# Patient Record
Sex: FEMALE | Race: WHITE
[De-identification: names, ages, dates, MRNs, and addresses within clinical notes are randomized per-mention and may not be internally consistent; named-entity substitution may affect disease eponyms.]

---

## 2017-04-11 ENCOUNTER — HOSPITAL ENCOUNTER (OUTPATIENT)
Dept: HOSPITAL 47 - RADUSWWP | Age: 22
Discharge: HOME | End: 2017-04-11
Payer: COMMERCIAL

## 2017-04-11 DIAGNOSIS — N91.2: ICD-10-CM

## 2017-04-11 DIAGNOSIS — R10.9: Primary | ICD-10-CM

## 2017-04-11 PROCEDURE — 76856 US EXAM PELVIC COMPLETE: CPT

## 2017-04-11 NOTE — US
EXAMINATION TYPE: US pelvic complete

 

DATE OF EXAM: 4/11/2017 2:20 PM

 

COMPARISON: NONE

 

CLINICAL HISTORY: 22-year-old female R10.9 Unspecified abdominal pain N91.2 Amenorrhea. On birth cont
rol for 7 years, never had a good outcome from birth control, always had problems, irregular cycles n
ow, patient states she had bladder issues also

 

Date of LMP:  irregular - lasted 1 day in March, very was heavy, 3 day light cycle in February

 

TECHNIQUE: Multiple transabdominal sonographic images of the pelvis are obtained.

 

FINDINGS:

 

Uterus:  Anteverted measuring 8.3 x 4.1 x 3.0 cm

 

Endometrial Stripe: 0.6 cm

 

Right Ovary:  3.1 x 1.9 x 1.5 cm

Left Ovary:  2.8 x 2.6 x 1.9 cm

 

There is follicular change on both sides.

 

No evident adnexal abnormality or cul-de-sac free fluid.

 

 

 

IMPRESSION: 

Endometrial stripe measuring 6 mm thick. Normal follicular change on both sides. No pelvic free fluid
.

## 2017-08-31 ENCOUNTER — HOSPITAL ENCOUNTER (EMERGENCY)
Dept: HOSPITAL 47 - EC | Age: 22
Discharge: HOME | End: 2017-08-31
Payer: COMMERCIAL

## 2017-08-31 VITALS — SYSTOLIC BLOOD PRESSURE: 111 MMHG | RESPIRATION RATE: 18 BRPM | HEART RATE: 62 BPM | DIASTOLIC BLOOD PRESSURE: 55 MMHG

## 2017-08-31 VITALS — TEMPERATURE: 98 F

## 2017-08-31 DIAGNOSIS — Z53.20: ICD-10-CM
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
## 2017-08-31 DIAGNOSIS — Z79.899: ICD-10-CM

## 2017-08-31 DIAGNOSIS — S06.0X0A: Primary | ICD-10-CM

## 2017-08-31 DIAGNOSIS — G40.909: ICD-10-CM

## 2017-08-31 PROCEDURE — 70450 CT HEAD/BRAIN W/O DYE: CPT

## 2017-08-31 PROCEDURE — 99284 EMERGENCY DEPT VISIT MOD MDM: CPT

## 2017-08-31 NOTE — ED
General Adult HPI





- General


Chief complaint: Head Injury


Stated complaint: Fall, head injury


Time Seen by Provider: 08/31/17 16:01


Source: patient, RN notes reviewed, old records reviewed


Mode of arrival: ambulatory


Limitations: no limitations





- History of Present Illness


Initial comments: 





This is a 22-year-old female here for evaluation of headache.  Patient head 

injury yesterday and has had headaches since.  Mild nausea no vomiting history 

of epilepsy but no longer on epileptic medication.  Patient's phone injury was 

a trip and fall rolling down a hill, did not lose consciousness





- Related Data


 Home Medications











 Medication  Instructions  Recorded  Confirmed


 


Cetirizine HCl [Zyrtec] 10 mg PO DAILY PRN 08/31/17 08/31/17


 


Multivitamins, Thera [Multivitamin 1 tab PO DAILY 08/31/17 08/31/17





(formulary)]   











 Allergies











Allergy/AdvReac Type Severity Reaction Status Date / Time


 


No Known Allergies Allergy   Verified 08/31/17 15:47














Review of Systems


ROS Statement: 


Those systems with pertinent positive or pertinent negative responses have been 

documented in the HPI.





ROS Other: All systems not noted in ROS Statement are negative.





Past Medical History


Past Medical History: Seizure Disorder


History of Any Multi-Drug Resistant Organisms: None Reported


Additional Past Surgical History / Comment(s): hemorrhoid


Past Psychological History: No Psychological Hx Reported


Smoking Status: Never smoker


Past Alcohol Use History: Occasional


Past Drug Use History: None Reported





General Exam


Limitations: no limitations


General appearance: alert, in no apparent distress


Head exam: Present: atraumatic, normocephalic, normal inspection


Eye exam: Present: normal appearance, PERRL, EOMI.  Absent: scleral icterus, 

conjunctival injection, periorbital swelling


ENT exam: Present: normal exam, mucous membranes moist


Neck exam: Present: normal inspection.  Absent: tenderness, meningismus, 

lymphadenopathy


Respiratory exam: Present: normal lung sounds bilaterally.  Absent: respiratory 

distress, wheezes, rales, rhonchi, stridor


Cardiovascular Exam: Present: regular rate, normal rhythm, normal heart sounds.

  Absent: systolic murmur, diastolic murmur, rubs, gallop, clicks


GI/Abdominal exam: Present: soft, normal bowel sounds.  Absent: distended, 

tenderness, guarding, rebound, rigid


Extremities exam: Present: normal inspection, full ROM, normal capillary 

refill.  Absent: tenderness, pedal edema, joint swelling, calf tenderness


Back exam: Present: normal inspection


Neurological exam: Present: alert, oriented X3, CN II-XII intact


Psychiatric exam: Present: normal affect, normal mood


Skin exam: Present: warm, dry, intact, normal color.  Absent: rash





Course


 Vital Signs











  08/31/17 08/31/17





  15:48 16:52


 


Temperature 98.5 F 98.8 F


 


Pulse Rate 77 67


 


Respiratory 16 16





Rate  


 


Blood Pressure 121/86 124/67


 


O2 Sat by Pulse 98 97





Oximetry  














- Reevaluation(s)


Reevaluation #1: 





08/31/17 17:17


Had headache is resolved





Medical Decision Making





- Medical Decision Making





22 female in the ER for evaluation of headache, closed head injury, CT negative

, headache is improved, patient woke and to follow-up with her neurologist as 

an outpatient basis, patient can be discharged home





- Radiology Data


Radiology results: report reviewed (CT brain is negative for acute disease), 

image reviewed





Disposition


Clinical Impression: 


 Closed head injury, Concussion without loss of consciousness





Disposition: HOME SELF-CARE


Condition: Good


Instructions:  Concussion (ED)


Referrals: 


Guadalupe Lyles MD [Primary Care Provider] - 1-2 days

## 2017-08-31 NOTE — CT
EXAMINATION TYPE: CT brain wo con

 

DATE OF EXAM: 8/31/2017

 

COMPARISON: 7/29/2013

 

HISTORY: Fall with head injury yesterday. Hx of epilepsy.

 

CT DLP: 1049.0 mGycm.  Automated Exposure Control for Dose Reduction was Utilized.

 

 

TECHNIQUE: CT scan of the head is performed without contrast.

 

FINDINGS: Ventricles and sulci appear normal. There is no mass effect nor midline shift. There is no 
sign of intracranial hemorrhage. The calvarium is intact.

 

 

IMPRESSION: Negative CT scan of the brain. No change..

## 2018-06-13 ENCOUNTER — HOSPITAL ENCOUNTER (EMERGENCY)
Dept: HOSPITAL 47 - EC | Age: 23
Discharge: HOME | End: 2018-06-13
Payer: COMMERCIAL

## 2018-06-13 VITALS
HEART RATE: 75 BPM | TEMPERATURE: 98 F | SYSTOLIC BLOOD PRESSURE: 116 MMHG | DIASTOLIC BLOOD PRESSURE: 63 MMHG | RESPIRATION RATE: 16 BRPM

## 2018-06-13 DIAGNOSIS — R11.2: ICD-10-CM

## 2018-06-13 DIAGNOSIS — R10.31: ICD-10-CM

## 2018-06-13 DIAGNOSIS — N83.202: Primary | ICD-10-CM

## 2018-06-13 LAB
ALBUMIN SERPL-MCNC: 4.4 G/DL (ref 3.5–5)
ALP SERPL-CCNC: 42 U/L (ref 38–126)
ALT SERPL-CCNC: 26 U/L (ref 9–52)
AMYLASE SERPL-CCNC: 90 U/L (ref 30–110)
ANION GAP SERPL CALC-SCNC: 11 MMOL/L
AST SERPL-CCNC: 20 U/L (ref 14–36)
BASOPHILS # BLD AUTO: 0.1 K/UL (ref 0–0.2)
BASOPHILS NFR BLD AUTO: 1 %
BUN SERPL-SCNC: 16 MG/DL (ref 7–17)
CALCIUM SPEC-MCNC: 9.2 MG/DL (ref 8.4–10.2)
CHLORIDE SERPL-SCNC: 103 MMOL/L (ref 98–107)
CO2 SERPL-SCNC: 25 MMOL/L (ref 22–30)
EOSINOPHIL # BLD AUTO: 0.3 K/UL (ref 0–0.7)
EOSINOPHIL NFR BLD AUTO: 2 %
ERYTHROCYTE [DISTWIDTH] IN BLOOD BY AUTOMATED COUNT: 4.51 M/UL (ref 3.8–5.4)
ERYTHROCYTE [DISTWIDTH] IN BLOOD: 12.9 % (ref 11.5–15.5)
GLUCOSE SERPL-MCNC: 78 MG/DL (ref 74–99)
HCT VFR BLD AUTO: 39.9 % (ref 34–46)
HGB BLD-MCNC: 13.5 GM/DL (ref 11.4–16)
LIPASE SERPL-CCNC: 76 U/L (ref 23–300)
LYMPHOCYTES # SPEC AUTO: 4.6 K/UL (ref 1–4.8)
LYMPHOCYTES NFR SPEC AUTO: 38 %
MCH RBC QN AUTO: 29.9 PG (ref 25–35)
MCHC RBC AUTO-ENTMCNC: 33.8 G/DL (ref 31–37)
MCV RBC AUTO: 88.4 FL (ref 80–100)
MONOCYTES # BLD AUTO: 0.5 K/UL (ref 0–1)
MONOCYTES NFR BLD AUTO: 4 %
NEUTROPHILS # BLD AUTO: 6.4 K/UL (ref 1.3–7.7)
NEUTROPHILS NFR BLD AUTO: 53 %
PH UR: 6 [PH] (ref 5–8)
PLATELET # BLD AUTO: 287 K/UL (ref 150–450)
POTASSIUM SERPL-SCNC: 3.8 MMOL/L (ref 3.5–5.1)
PROT SERPL-MCNC: 6.9 G/DL (ref 6.3–8.2)
RBC UR QL: 1 /HPF (ref 0–5)
SODIUM SERPL-SCNC: 139 MMOL/L (ref 137–145)
SP GR UR: 1.02 (ref 1–1.03)
SQUAMOUS UR QL AUTO: 2 /HPF (ref 0–4)
UROBILINOGEN UR QL STRIP: <2 MG/DL (ref ?–2)
WBC # BLD AUTO: 12 K/UL (ref 3.8–10.6)
WBC #/AREA URNS HPF: 13 /HPF (ref 0–5)

## 2018-06-13 PROCEDURE — 81025 URINE PREGNANCY TEST: CPT

## 2018-06-13 PROCEDURE — 83605 ASSAY OF LACTIC ACID: CPT

## 2018-06-13 PROCEDURE — 96361 HYDRATE IV INFUSION ADD-ON: CPT

## 2018-06-13 PROCEDURE — 99284 EMERGENCY DEPT VISIT MOD MDM: CPT

## 2018-06-13 PROCEDURE — 80053 COMPREHEN METABOLIC PANEL: CPT

## 2018-06-13 PROCEDURE — 96374 THER/PROPH/DIAG INJ IV PUSH: CPT

## 2018-06-13 PROCEDURE — 83690 ASSAY OF LIPASE: CPT

## 2018-06-13 PROCEDURE — 36415 COLL VENOUS BLD VENIPUNCTURE: CPT

## 2018-06-13 PROCEDURE — 87086 URINE CULTURE/COLONY COUNT: CPT

## 2018-06-13 PROCEDURE — 96375 TX/PRO/DX INJ NEW DRUG ADDON: CPT

## 2018-06-13 PROCEDURE — 82150 ASSAY OF AMYLASE: CPT

## 2018-06-13 PROCEDURE — 81001 URINALYSIS AUTO W/SCOPE: CPT

## 2018-06-13 PROCEDURE — 87040 BLOOD CULTURE FOR BACTERIA: CPT

## 2018-06-13 PROCEDURE — 85025 COMPLETE CBC W/AUTO DIFF WBC: CPT

## 2018-06-13 PROCEDURE — 74177 CT ABD & PELVIS W/CONTRAST: CPT

## 2018-06-13 NOTE — CT
EXAMINATION TYPE: CT abdomen pelvis w con

 

DATE OF EXAM: 6/13/2018

 

COMPARISON: NONE

 

HISTORY: Right lower quadrant pain and vomiting.

 

CT DLP: 1077 mGycm

Automated exposure control for dose reduction was used.

 

TECHNIQUE:  Helical acquisition of images was performed from the lung bases through the pelvis.

 

CONTRAST: 

Performed without Oral Contrast and with IV Contrast, patient injected with 100 mL of Isovue M300.

 

FINDINGS: 

 

Lung bases are clear. There is no pleural effusion. Liver spleen pancreas gallbladder appear normal. 
Bile ducts are not dilated.

 

There is no adrenal mass. Kidneys show satisfactory contrast opacification. There is no hydronephrosi
s. There is no retroperitoneal adenopathy. There is no ascites. There is 2.5 cm cyst on the left ovar
y. Uterus is anteverted. Fecal pattern appears normal. Appendix is not seen. There is no sign of appe
ndicitis. The bony structures appear normal.

IMPRESSION: 

LEFT OVARIAN CYST. NO SIGN OF APPENDICITIS.

## 2018-06-13 NOTE — ED
Abdominal Pain HPI





- General


Chief Complaint: Abdominal Pain


Stated Complaint: abd pain


Time Seen by Provider: 06/13/18 13:59


Source: patient, RN notes reviewed, old records reviewed


Mode of arrival: ambulatory


Limitations: no limitations





- History of Present Illness


Initial Comments: 





23-year-old female presents emergency Department today.Intermittent right lower 

quadrant abdominal pain for the past few months.  She reports that she saw her 

primary care provider on Friday and was told to come to the emergency 

department if her symptoms continue to persist.  Patient reports she's had a 

few episodes of nausea and vomiting.  No fevers recently but had have some 

earlier this week.  She denies any chest pain or shortness of breath.  No 

urinary symptoms.  Does report she's had some constipation.  No history of sick 

contacts.  No vaginal discharge.  Last menstrual period was 3 weeks ago.  Not 

concerned for pregnancy.





- Related Data


 Home Medications











 Medication  Instructions  Recorded  Confirmed


 


Cetirizine HCl [Zyrtec] 10 mg PO DAILY PRN 08/31/17 06/13/18


 


Albuterol Inhaler [Ventolin Hfa 1 - 2 puff INHALATION RT-Q6H PRN 06/13/18 06/13/ 18





Inhaler]   








 Previous Rx's











 Medication  Instructions  Recorded


 


Ondansetron Odt [Zofran Odt] 4 mg PO Q8HR PRN #12 tab 06/13/18











 Allergies











Allergy/AdvReac Type Severity Reaction Status Date / Time


 


No Known Allergies Allergy   Verified 06/13/18 14:14














Review of Systems


ROS Statement: 


Those systems with pertinent positive or pertinent negative responses have been 

documented in the HPI.





ROS Other: All systems not noted in ROS Statement are negative.





Past Medical History


Past Medical History: Seizure Disorder


History of Any Multi-Drug Resistant Organisms: None Reported


Additional Past Surgical History / Comment(s): hemorrhoid


Past Psychological History: No Psychological Hx Reported


Smoking Status: Never smoker


Past Alcohol Use History: Occasional


Past Drug Use History: None Reported





General Exam





- General Exam Comments


Initial Comments: 





Physical is a 23-year-old female.  Alert and oriented.


Limitations: no limitations


General appearance: alert, in no apparent distress


Head exam: Present: atraumatic, normocephalic, normal inspection


Eye exam: Present: normal appearance, PERRL, EOMI.  Absent: scleral icterus, 

conjunctival injection, periorbital swelling


ENT exam: Present: normal exam, mucous membranes moist


Neck exam: Present: normal inspection.  Absent: tenderness, meningismus, 

lymphadenopathy


Respiratory exam: Present: normal lung sounds bilaterally.  Absent: respiratory 

distress, wheezes, rales, rhonchi, stridor


Cardiovascular Exam: Present: regular rate, normal rhythm, normal heart sounds.

  Absent: systolic murmur, diastolic murmur, rubs, gallop, clicks


GI/Abdominal exam: Present: soft, tenderness (minimal RLQ tenderness. ), normal 

bowel sounds.  Absent: distended, guarding, rebound, rigid


Extremities exam: Present: normal inspection, full ROM, normal capillary 

refill.  Absent: tenderness, pedal edema, joint swelling, calf tenderness


Back exam: Present: normal inspection


Neurological exam: Present: alert, oriented X3, CN II-XII intact


Psychiatric exam: Present: normal affect, normal mood


Skin exam: Present: warm, dry, intact, normal color.  Absent: rash





Course


 Vital Signs











  06/13/18 06/13/18





  13:46 17:48


 


Temperature 98.8 F 98 F


 


Pulse Rate 76 75


 


Respiratory 18 16





Rate  


 


Blood Pressure 128/73 116/63


 


O2 Sat by Pulse 99 97





Oximetry  














Medical Decision Making





- Medical Decision Making


23-year-old female presents emergency Department today.Intermittent right lower 

quadrant abdominal pain for the past few months.  She reports that she saw her 

primary care provider on Friday and was told to come to the emergency 

department if her symptoms continue to persist.  Patient reports she's had a 

few episodes of nausea and vomiting.  No fevers recently but had have some 

earlier this week.  Patient  had minimal to no tenderness on exam. PAtient labs 

show mild leukocytosis. Discussed other etiolgoy such as ovarian cyst for RLQ 

pain. PAtient continues to persist with concern for appendicitis, CT scan 

completed. CT scan shows left ovarian cyst, no appendicitis. Discussed follow 

up with PCP and will dc with zofran. 








- Lab Data


Result diagrams: 


 06/13/18 14:45





 06/13/18 14:45


 Lab Results











  06/13/18 06/13/18 06/13/18 Range/Units





  14:45 14:45 14:45 


 


WBC   12.0 H   (3.8-10.6)  k/uL


 


RBC   4.51   (3.80-5.40)  m/uL


 


Hgb   13.5   (11.4-16.0)  gm/dL


 


Hct   39.9   (34.0-46.0)  %


 


MCV   88.4   (80.0-100.0)  fL


 


MCH   29.9   (25.0-35.0)  pg


 


MCHC   33.8   (31.0-37.0)  g/dL


 


RDW   12.9   (11.5-15.5)  %


 


Plt Count   287   (150-450)  k/uL


 


Neutrophils %   53   %


 


Lymphocytes %   38   %


 


Monocytes %   4   %


 


Eosinophils %   2   %


 


Basophils %   1   %


 


Neutrophils #   6.4   (1.3-7.7)  k/uL


 


Lymphocytes #   4.6   (1.0-4.8)  k/uL


 


Monocytes #   0.5   (0-1.0)  k/uL


 


Eosinophils #   0.3   (0-0.7)  k/uL


 


Basophils #   0.1   (0-0.2)  k/uL


 


Sodium  139    (137-145)  mmol/L


 


Potassium  3.8    (3.5-5.1)  mmol/L


 


Chloride  103    ()  mmol/L


 


Carbon Dioxide  25    (22-30)  mmol/L


 


Anion Gap  11    mmol/L


 


BUN  16    (7-17)  mg/dL


 


Creatinine  0.70    (0.52-1.04)  mg/dL


 


Est GFR (CKD-EPI)AfAm  >90    (>60 ml/min/1.73 sqM)  


 


Est GFR (CKD-EPI)NonAf  >90    (>60 ml/min/1.73 sqM)  


 


Glucose  78    (74-99)  mg/dL


 


Plasma Lactic Acid Abdulaziz    1.0  (0.7-2.0)  mmol/L


 


Calcium  9.2    (8.4-10.2)  mg/dL


 


Total Bilirubin  0.1 L    (0.2-1.3)  mg/dL


 


AST  20    (14-36)  U/L


 


ALT  26    (9-52)  U/L


 


Alkaline Phosphatase  42    ()  U/L


 


Total Protein  6.9    (6.3-8.2)  g/dL


 


Albumin  4.4    (3.5-5.0)  g/dL


 


Amylase  90    ()  U/L


 


Lipase  76    ()  U/L


 


Urine Color     


 


Urine Appearance     (Clear)  


 


Urine pH     (5.0-8.0)  


 


Ur Specific Gravity     (1.001-1.035)  


 


Urine Protein     (Negative)  


 


Urine Glucose (UA)     (Negative)  


 


Urine Ketones     (Negative)  


 


Urine Blood     (Negative)  


 


Urine Nitrite     (Negative)  


 


Urine Bilirubin     (Negative)  


 


Urine Urobilinogen     (<2.0)  mg/dL


 


Ur Leukocyte Esterase     (Negative)  


 


Urine RBC     (0-5)  /hpf


 


Urine WBC     (0-5)  /hpf


 


Ur Squamous Epith Cells     (0-4)  /hpf


 


Amorphous Sediment     (None)  /hpf


 


Urine Bacteria     (None)  /hpf


 


Urine Mucus     (None)  /hpf


 


Urine HCG, Qual     (Not Detectd)  














  06/13/18 06/13/18 Range/Units





  14:45 14:45 


 


WBC    (3.8-10.6)  k/uL


 


RBC    (3.80-5.40)  m/uL


 


Hgb    (11.4-16.0)  gm/dL


 


Hct    (34.0-46.0)  %


 


MCV    (80.0-100.0)  fL


 


MCH    (25.0-35.0)  pg


 


MCHC    (31.0-37.0)  g/dL


 


RDW    (11.5-15.5)  %


 


Plt Count    (150-450)  k/uL


 


Neutrophils %    %


 


Lymphocytes %    %


 


Monocytes %    %


 


Eosinophils %    %


 


Basophils %    %


 


Neutrophils #    (1.3-7.7)  k/uL


 


Lymphocytes #    (1.0-4.8)  k/uL


 


Monocytes #    (0-1.0)  k/uL


 


Eosinophils #    (0-0.7)  k/uL


 


Basophils #    (0-0.2)  k/uL


 


Sodium    (137-145)  mmol/L


 


Potassium    (3.5-5.1)  mmol/L


 


Chloride    ()  mmol/L


 


Carbon Dioxide    (22-30)  mmol/L


 


Anion Gap    mmol/L


 


BUN    (7-17)  mg/dL


 


Creatinine    (0.52-1.04)  mg/dL


 


Est GFR (CKD-EPI)AfAm    (>60 ml/min/1.73 sqM)  


 


Est GFR (CKD-EPI)NonAf    (>60 ml/min/1.73 sqM)  


 


Glucose    (74-99)  mg/dL


 


Plasma Lactic Acid Abdulaziz    (0.7-2.0)  mmol/L


 


Calcium    (8.4-10.2)  mg/dL


 


Total Bilirubin    (0.2-1.3)  mg/dL


 


AST    (14-36)  U/L


 


ALT    (9-52)  U/L


 


Alkaline Phosphatase    ()  U/L


 


Total Protein    (6.3-8.2)  g/dL


 


Albumin    (3.5-5.0)  g/dL


 


Amylase    ()  U/L


 


Lipase    ()  U/L


 


Urine Color   Yellow  


 


Urine Appearance   Clear  (Clear)  


 


Urine pH   6.0  (5.0-8.0)  


 


Ur Specific Gravity   1.020  (1.001-1.035)  


 


Urine Protein   Negative  (Negative)  


 


Urine Glucose (UA)   Negative  (Negative)  


 


Urine Ketones   Negative  (Negative)  


 


Urine Blood   Negative  (Negative)  


 


Urine Nitrite   Negative  (Negative)  


 


Urine Bilirubin   Negative  (Negative)  


 


Urine Urobilinogen   <2.0  (<2.0)  mg/dL


 


Ur Leukocyte Esterase   Small H  (Negative)  


 


Urine RBC   1  (0-5)  /hpf


 


Urine WBC   13 H  (0-5)  /hpf


 


Ur Squamous Epith Cells   2  (0-4)  /hpf


 


Amorphous Sediment   Rare H  (None)  /hpf


 


Urine Bacteria   Rare H  (None)  /hpf


 


Urine Mucus   Rare H  (None)  /hpf


 


Urine HCG, Qual  Not Detected   (Not Detectd)  














- Radiology Data


Radiology results: report reviewed


 CT abdomen pelvis with contrast shows Left ovarian cyst no sign of 

appendicitis.





Disposition


Clinical Impression: 


 Right sided abdominal pain, Nausea & vomiting, Left ovarian cyst





Disposition: HOME SELF-CARE


Condition: Good


Instructions:  Abdominal Pain (ED)


Additional Instructions: 


Patient is to follow-up with your primary care provider.  Return to the 

emergency department if any alarming signs or symptoms occur.


Prescriptions: 


Ondansetron Odt [Zofran Odt] 4 mg PO Q8HR PRN #12 tab


 PRN Reason: Nausea


Is patient prescribed a controlled substance at d/c from ED?: No


When asked, does pt state using other controlled substances?: No


If prescribed controlled substance>3 days was MAPS reviewed?: No


If opioid is for acute pain is fill amount 7 days or less?: No


If Rx opioid, was Start Talking consent form obtained?: No


Referrals: 


Guadalupe Lyles MD [Primary Care Provider] - 1-2 days


Time of Disposition: 17:23

## 2019-11-19 ENCOUNTER — HOSPITAL ENCOUNTER (OUTPATIENT)
Dept: HOSPITAL 47 - RADUSWWP | Age: 24
Discharge: HOME | End: 2019-11-19
Attending: OBSTETRICS & GYNECOLOGY
Payer: COMMERCIAL

## 2019-11-19 DIAGNOSIS — Z34.01: ICD-10-CM

## 2019-11-19 DIAGNOSIS — Z3A.01: ICD-10-CM

## 2019-11-19 DIAGNOSIS — Z36.9: Primary | ICD-10-CM

## 2019-11-19 LAB
ERYTHROCYTE [DISTWIDTH] IN BLOOD BY AUTOMATED COUNT: 4.27 M/UL (ref 3.8–5.4)
ERYTHROCYTE [DISTWIDTH] IN BLOOD: 12 % (ref 11.5–15.5)
GLUCOSE SERPL-MCNC: 93 MG/DL (ref 74–99)
HCT VFR BLD AUTO: 38.8 % (ref 34–46)
HGB BLD-MCNC: 13.1 GM/DL (ref 11.4–16)
MCH RBC QN AUTO: 30.6 PG (ref 25–35)
MCHC RBC AUTO-ENTMCNC: 33.7 G/DL (ref 31–37)
MCV RBC AUTO: 91 FL (ref 80–100)
PLATELET # BLD AUTO: 251 K/UL (ref 150–450)
WBC # BLD AUTO: 11.7 K/UL (ref 3.8–10.6)

## 2019-11-19 PROCEDURE — 86780 TREPONEMA PALLIDUM: CPT

## 2019-11-19 PROCEDURE — 87390 HIV-1 AG IA: CPT

## 2019-11-19 PROCEDURE — 82565 ASSAY OF CREATININE: CPT

## 2019-11-19 PROCEDURE — 76801 OB US < 14 WKS SINGLE FETUS: CPT

## 2019-11-19 PROCEDURE — 86762 RUBELLA ANTIBODY: CPT

## 2019-11-19 PROCEDURE — 86901 BLOOD TYPING SEROLOGIC RH(D): CPT

## 2019-11-19 PROCEDURE — 86850 RBC ANTIBODY SCREEN: CPT

## 2019-11-19 PROCEDURE — 86900 BLOOD TYPING SEROLOGIC ABO: CPT

## 2019-11-19 PROCEDURE — 36415 COLL VENOUS BLD VENIPUNCTURE: CPT

## 2019-11-19 PROCEDURE — 87340 HEPATITIS B SURFACE AG IA: CPT

## 2019-11-19 PROCEDURE — 82947 ASSAY GLUCOSE BLOOD QUANT: CPT

## 2019-11-19 PROCEDURE — 85027 COMPLETE CBC AUTOMATED: CPT

## 2019-11-19 NOTE — US
EXAMINATION TYPE: Transabdominal

 

DATE OF EXAM: 2019 1:10 PM

 

COMPARISON: NONE

 

CLINICAL HISTORY: Z36 Confirm Dates. Confirm dates,  1

 

EXAM PERFORMED:  Transabdominal (TA)

 

EXAM MEASUREMENTS:

 

GESTATIONAL AGE / DATING

 

Physician Established: ( 6   weeks/5 days) 

** EDC:  2020

Dates by LMP: (6 weeks/5 days)  

** EDC: 2020

Dates by First Scan:  This is 1st scan 

Dates by Current Scan for: (  6  weeks/5 days)  

** EDC: 2020

 

MATERNAL ANATOMY 

 

Uterus: 9.8 x 5.5 x 5.7cm, anteverted

Right Ovary: 2.4 x 1.4 x 1.4cm

Left Ovary: 3.5 x 2.5 x 2.9cm

Post CDS / Adnexa: wnl

Presence of free fluid: wnl

Presence of corpus luteal cyst: left ovary: 2.6 x 1.6 x 1.9cm cystic area 

Presence of subchorionic bleed: wnl

 

GESTATION / FETAL SURVEY

 

CRL: 0.7cm (6 weeks/5 days)

Yolk Sac (normal less than 6mm): 3.0mm

Heart Rate: 113 bpm

Rhythm:  Normal

IUP:  Live IUP

 

Date of LMP: 10/03/2019

Beta HcG (if available):  Not available at time of exam

 

 

 

IMPRESSION:

Single live intrauterine pregnancy measuring 6 weeks 5 days with a heart rate of 113bpm and an estima
jose delivery date of 2020. Dates are concordant with menstrual age.

## 2020-07-02 NOTE — P.HPOB
History of Present Illness


H&P Date: 20


Chief Complaint: Requested induction of labor





This patient is a pleasant 25-year-old  1 para 0 female estimated date of

confinement 2020 estimated gestational age 39-4/7 weeks who presents to 

labor and delivery for requested induction of labor.  Patient's prenatal care 

has been uncomplicated.  Has been uncomfortable is requesting induction of 

labor.





Review of Systems


Genitourinary: Reports pregnant


Menstruation: Reports amenorrhea





Past Medical History


History of Any Multi-Drug Resistant Organisms: None Reported


Additional Past Surgical History / Comment(s): hemorrhoid


Past Anesthesia/Blood Transfusion Reactions: No Reported Reaction


Past Psychological History: No Psychological Hx Reported


Smoking Status: Never smoker


Past Alcohol Use History: None Reported


Past Drug Use History: None Reported





Medications and Allergies


                                Home Medications











 Medication  Instructions  Recorded  Confirmed  Type


 


Cetirizine HCl [Zyrtec] 10 mg PO DAILY PRN 17 History


 


Albuterol Inhaler (Mhu) [Ventolin 1 - 2 puff INHALATION RT-Q6H PRN 18 History





Hfa Inhaler]    


 


Ondansetron Odt [Zofran Odt] 4 mg PO Q8HR PRN #12 tab 18  Rx








                                    Allergies











Allergy/AdvReac Type Severity Reaction Status Date / Time


 


No Known Allergies Allergy   Verified 18 14:14














Exam





- OBG Physical Exam


Abdomen: bowel sounds normal, no diffuse tenderness, no bruit present, no 

guarding noted, no hepatomegaly, no splenomegaly, no mass


Vulva: both: normal


Vagina: normal moisture, no discharge


Cervix: no lesion (Cervix in the office is 1 cm dilated and uneffaced.), no 

discharge


Uterus: enlarged (Fundal height is 39 cm)





Results





Prenatal blood work shows she is A positive, rubella immune, RPR nonreactive, 

hepatitis B negative, HIV is nonreactive, Glucola was normal, the quad screen 

was negative, the ultrasounds have shown normal growth.  Group B  strep was 

positive





Assessment and Plan


Assessment: 





This is a pleasant 25-year-old  1 para 0 female 39-4/7 weeks who presents

to labor and delivery for requested induction of labor.  Plan is induction of 

labor and anticipate vaginal delivery.  Patient is a positive group B strep 

 culture and therefore will be started on IV antibiotics 

prophylactically.


(1) 39 weeks gestation of pregnancy


Status: Acute   Code(s): Z3A.39 - 39 WEEKS GESTATION OF PREGNANCY   SNOMED 

Code(s): 87802088


   





(2) Group B streptococcal carriage complicating pregnancy


Status: Acute   Code(s): O99.820 - STREPTOCOCCUS B CARRIER STATE COMPLICATING 

PREGNANCY   SNOMED Code(s): 128169983865519


   





(3) Elective induction of labor planned


Status: Acute   Code(s): NDJ8899 -    SNOMED Code(s): 183388329

## 2020-07-06 ENCOUNTER — HOSPITAL ENCOUNTER (INPATIENT)
Dept: HOSPITAL 47 - 4FBP | Age: 25
LOS: 2 days | Discharge: HOME | End: 2020-07-08
Attending: OBSTETRICS & GYNECOLOGY | Admitting: OBSTETRICS & GYNECOLOGY
Payer: COMMERCIAL

## 2020-07-06 DIAGNOSIS — G40.909: ICD-10-CM

## 2020-07-06 DIAGNOSIS — K21.9: ICD-10-CM

## 2020-07-06 DIAGNOSIS — Z3A.39: ICD-10-CM

## 2020-07-06 DIAGNOSIS — J45.909: ICD-10-CM

## 2020-07-06 LAB
BASOPHILS # BLD AUTO: 0 K/UL (ref 0–0.2)
BASOPHILS NFR BLD AUTO: 0 %
EOSINOPHIL # BLD AUTO: 0.2 K/UL (ref 0–0.7)
EOSINOPHIL NFR BLD AUTO: 2 %
ERYTHROCYTE [DISTWIDTH] IN BLOOD BY AUTOMATED COUNT: 4 M/UL (ref 3.8–5.4)
ERYTHROCYTE [DISTWIDTH] IN BLOOD: 13.3 % (ref 11.5–15.5)
HCT VFR BLD AUTO: 34.4 % (ref 34–46)
HGB BLD-MCNC: 11.2 GM/DL (ref 11.4–16)
LYMPHOCYTES # SPEC AUTO: 2.3 K/UL (ref 1–4.8)
LYMPHOCYTES NFR SPEC AUTO: 24 %
MCH RBC QN AUTO: 27.9 PG (ref 25–35)
MCHC RBC AUTO-ENTMCNC: 32.4 G/DL (ref 31–37)
MCV RBC AUTO: 85.9 FL (ref 80–100)
MONOCYTES # BLD AUTO: 0.5 K/UL (ref 0–1)
MONOCYTES NFR BLD AUTO: 5 %
NEUTROPHILS # BLD AUTO: 6.5 K/UL (ref 1.3–7.7)
NEUTROPHILS NFR BLD AUTO: 67 %
PLATELET # BLD AUTO: 197 K/UL (ref 150–450)
WBC # BLD AUTO: 9.7 K/UL (ref 3.8–10.6)

## 2020-07-06 PROCEDURE — 90471 IMMUNIZATION ADMIN: CPT

## 2020-07-06 PROCEDURE — 88307 TISSUE EXAM BY PATHOLOGIST: CPT

## 2020-07-06 PROCEDURE — 86900 BLOOD TYPING SEROLOGIC ABO: CPT

## 2020-07-06 PROCEDURE — 86850 RBC ANTIBODY SCREEN: CPT

## 2020-07-06 PROCEDURE — 86901 BLOOD TYPING SEROLOGIC RH(D): CPT

## 2020-07-06 PROCEDURE — 00HU33Z INSERTION OF INFUSION DEVICE INTO SPINAL CANAL, PERCUTANEOUS APPROACH: ICD-10-PCS

## 2020-07-06 PROCEDURE — 90707 MMR VACCINE SC: CPT

## 2020-07-06 PROCEDURE — 10907ZC DRAINAGE OF AMNIOTIC FLUID, THERAPEUTIC FROM PRODUCTS OF CONCEPTION, VIA NATURAL OR ARTIFICIAL OPENING: ICD-10-PCS

## 2020-07-06 PROCEDURE — 85025 COMPLETE CBC W/AUTO DIFF WBC: CPT

## 2020-07-06 PROCEDURE — 3E0R3BZ INTRODUCTION OF ANESTHETIC AGENT INTO SPINAL CANAL, PERCUTANEOUS APPROACH: ICD-10-PCS

## 2020-07-06 RX ADMIN — POTASSIUM CHLORIDE SCH MLS/HR: 14.9 INJECTION, SOLUTION INTRAVENOUS at 06:16

## 2020-07-06 RX ADMIN — DOCUSATE SODIUM AND SENNOSIDES SCH: 50; 8.6 TABLET ORAL at 21:09

## 2020-07-06 RX ADMIN — SODIUM CITRATE AND CITRIC ACID MONOHYDRATE ONE ML: 500; 334 SOLUTION ORAL at 06:42

## 2020-07-06 RX ADMIN — POTASSIUM CHLORIDE SCH: 14.9 INJECTION, SOLUTION INTRAVENOUS at 16:31

## 2020-07-06 RX ADMIN — KETOROLAC TROMETHAMINE PRN MG: 30 INJECTION, SOLUTION INTRAMUSCULAR at 22:11

## 2020-07-06 RX ADMIN — SODIUM CITRATE AND CITRIC ACID MONOHYDRATE ONE ML: 500; 334 SOLUTION ORAL at 13:42

## 2020-07-06 RX ADMIN — POTASSIUM CHLORIDE SCH MLS/HR: 14.9 INJECTION, SOLUTION INTRAVENOUS at 22:12

## 2020-07-06 RX ADMIN — POTASSIUM CHLORIDE SCH MLS/HR: 14.9 INJECTION, SOLUTION INTRAVENOUS at 11:27

## 2020-07-06 NOTE — P.OP
Date of Procedure: 20


Preoperative Diagnosis: 


#1: 39-3/7 week pregnancy.  #2: Nonreassuring fetal heart tones remote from 

delivery


Postoperative Diagnosis: 


#1: Same.  #2: Nuchal cord 1


Procedure(s) Performed: 


Primary low transverse  section


Anesthesia: epidural


Surgeon: Mingo Marsh


Assistant #1: Lorenzo Hedrick


Estimated Blood Loss (ml): 800


Pathology: other (Placenta)


Condition: stable


Disposition: floor


Indications for Procedure: 


Please see dictated H&P for intimate details of this patient's admission.  In 

brief summary this is a pleasant 25-year-old  1 para 0 female 39-3/7 

weeks gestation admitted to labor and delivery for requested induction of labor.

 On admission patient is 2-3 cm dilated has artificial rupture membranes for 

clear fluid.  She does have some intermittent decelerations that resolved with 

position changes.  She gets to 5-6 cm dilated and does get a dose of Stadol and 

then an epidural.  Patient goes on to have persistent variable decelerations 

that are deep to approximately 80s despite the usual intrauterine resuscitative 

measures.  Unfortunately she is remote from delivery and therefore recommend she

proceed with primary  section for delivery.  Patient understands this 

surgery and risks including infection, bleeding, possible injury to bowel, 

bladder, vessels, and/or other organs.  She understands risk of DVT and 

pulmonary embolism.  All the patient's questions are answered written consent 

obtained


Operative Findings: 


This is a vigorous viable female infant Apgars 8 and 9 delivery time was 1354 

hrs.  Infant had a nuchal cord that was tight 1


Description of Procedure: 


This patient has a Cunningham catheter placed to straight drain.  She is subsequently

taken to the operating room where the epidural is dosed up for sufficient level 

of surgery.  Patient has abdominal prep and drape.  Scalpels and taken 

Pfannenstiel skin incision is then made.  A second scalpel is taken down the 

fascia the fascia scored with a knife.  Fascial incision extended superior and 

inferior without difficulty   Rectus muscles are  the peritoneum 

identified and entered sharply.  Peritoneal incision extended superior and 

inferior without difficulty.  Bladder blade is then placed.  Bladder peritoneum 

was taken off the lower uterine segment sharply.  Scalpels and taken low 

transverse uterine incision is then made.  Using a hemostat I into the uterine 

cavity bluntly.  There is loss of clear fluid.  Incision extended bluntly.  

Infant's head is then guided through the incision with fundal pressure.  Mouth 

and nares are bulb suctioned.  There is a nuchal cord which was tight and 

reduced.  Have deliver the rest this infant's body.  This is a vigorous viable 

female infant Apgars are 8 and 9 delivery time is 1354 hrs.  After delivery of 

the infant the umbilical cord is doubly clamped and cut infant is handed off to 

the nurses in attendance.  The placenta is then manually extracted intact.  

Uterus is then externalized and the uterine incision demarcated with See 

clamps.  Uterine incision closed in 0 Vicryl running locked fashion 2 layers.  

Excellent hemostasis is noted.  Bladder peritoneum was then reapproximated using

a 3-0 Vicryl.  Excess fluid is removed from the abdomen and pelvis.  Uterus, 

tubes, and ovaries appear normal for term gestation.  The parietal peritoneum 

was then identified and closed using 0 Vicryl running fashion.  Rectus muscles 

reapproximated in 0 Vicryl interrupted fashion.  Fascial incision closed using 0

PDS.  Fascial incision is intact and hemostatic.  Subcutaneous tissues then 

inspected and closed using a 3-0 Vicryl.  Skin is and closed using staples.  All

counts correct 3.  There are no complications.  Infant and mother are stable in

delivery room.

## 2020-07-07 LAB
BASOPHILS # BLD AUTO: 0 K/UL (ref 0–0.2)
BASOPHILS NFR BLD AUTO: 0 %
EOSINOPHIL # BLD AUTO: 0.1 K/UL (ref 0–0.7)
EOSINOPHIL NFR BLD AUTO: 0 %
ERYTHROCYTE [DISTWIDTH] IN BLOOD BY AUTOMATED COUNT: 3.41 M/UL (ref 3.8–5.4)
ERYTHROCYTE [DISTWIDTH] IN BLOOD: 13.7 % (ref 11.5–15.5)
HCT VFR BLD AUTO: 29 % (ref 34–46)
HGB BLD-MCNC: 9.4 GM/DL (ref 11.4–16)
LYMPHOCYTES # SPEC AUTO: 1.4 K/UL (ref 1–4.8)
LYMPHOCYTES NFR SPEC AUTO: 10 %
MCH RBC QN AUTO: 27.7 PG (ref 25–35)
MCHC RBC AUTO-ENTMCNC: 32.5 G/DL (ref 31–37)
MCV RBC AUTO: 85 FL (ref 80–100)
MONOCYTES # BLD AUTO: 0.8 K/UL (ref 0–1)
MONOCYTES NFR BLD AUTO: 5 %
NEUTROPHILS # BLD AUTO: 11.9 K/UL (ref 1.3–7.7)
NEUTROPHILS NFR BLD AUTO: 83 %
PLATELET # BLD AUTO: 186 K/UL (ref 150–450)
WBC # BLD AUTO: 14.3 K/UL (ref 3.8–10.6)

## 2020-07-07 RX ADMIN — DOCUSATE SODIUM AND SENNOSIDES SCH EACH: 50; 8.6 TABLET ORAL at 08:00

## 2020-07-07 RX ADMIN — DIMETHICONE PRN MG: 80 TABLET, CHEWABLE ORAL at 11:50

## 2020-07-07 RX ADMIN — DIMETHICONE PRN MG: 80 TABLET, CHEWABLE ORAL at 19:55

## 2020-07-07 RX ADMIN — DOCUSATE SODIUM AND SENNOSIDES SCH EACH: 50; 8.6 TABLET ORAL at 19:55

## 2020-07-07 RX ADMIN — HYDROCODONE BITARTRATE AND ACETAMINOPHEN PRN EACH: 5; 325 TABLET ORAL at 21:01

## 2020-07-07 RX ADMIN — KETOROLAC TROMETHAMINE PRN MG: 30 INJECTION, SOLUTION INTRAMUSCULAR at 08:00

## 2020-07-07 RX ADMIN — IBUPROFEN PRN MG: 600 TABLET ORAL at 14:46

## 2020-07-07 NOTE — P.PN
Progress Note - Text


Progress Note Date: 20





25-year-old female status post  section with Duramorph spinal postop day

#1.  Patient doing well, VAS 0 out of 10 in severity.  No pruritus, no motor or 

sensory deficit.  Patient ambulating tolerating diet.  Overall patient doing 

well

## 2020-07-07 NOTE — P.PNOBGPC
Subjective





- Subjective


Patient reports: Reports appetite normal, Reports voiding normally, Reports pain

well controlled, Reports ambulating normally


[place]: doing well





Objective





- Vital Signs


Latest vital signs: 


                                   Vital Signs











  Temp Pulse Resp BP Pulse Ox


 


 20 05:00    14  


 


 20 03:39  96 F L  73  12  143/81  98


 


 20 03:00    12   98


 


 20 01:00    12  


 


 20 00:00  98.1 F  61  14  132/86  98


 


 20 23:00    14   98


 


 20 20:54    14  


 


 20 20:00  97.8 F  68  14  142/85  98


 


 20 19:01      98


 


 20 19:00    14  


 


 20 17:00    15  


 


 20 16:21  98 F  75  15  139/75  98


 


 20 15:51  97.4 F L  74  15  127/80 


 


 20 15:21    15  123/70  98


 


 20 15:06   65  15  119/65 


 


 20 14:51   78  15  123/63  98


 


 20 14:36   75  15  122/56  98


 


 20 14:21  97.4 F L  84  15  116/55  98








                                Intake and Output











 20





 14:59 22:59 06:59


 


Output Total 800 300 700


 


Balance -800 -300 -700


 


Output:   


 


  Urine  300 700


 


    Uretheral ([place])   200


 


  Estimated Blood Loss 800  


 


Other:   


 


  # Voids   1














- Exam


Lungs: bilateral: normal


Chest: Normal S1, Normal S2


Extremities: Present: normal


Abdomen: Present: normal appearance, soft.  Absent: distention, tenderness


Incision: Present: normal, dry, intact


Uterus: Present: normal, firm





- Labs


Labs: 


                  Abnormal Lab Results - Last 24 Hours (Table)











  20 Range/Units





  05:24 


 


WBC  14.3 H  (3.8-10.6)  k/uL


 


RBC  3.41 L  (3.80-5.40)  m/uL


 


Hgb  9.4 L D  (11.4-16.0)  gm/dL


 


Hct  29.0 L  (34.0-46.0)  %


 


Neutrophils #  11.9 H  (1.3-7.7)  k/uL














Assessment and Plan


Assessment: 





Postoperative day #1.  Patient is resting without new complaints.  Vital signs 

are stable she is afebrile.  Uterus is firm nontender and her incision is intact

and dry.  Hemoglobin today is 9.4 which is consistent with her preoperative 

hemoglobin.  Plan today is advanced to regular diet, allow the patient to 

shower, encourage ambulation, continue routine postoperative care.


(1) 39 weeks gestation of pregnancy


Current Visit: No   Status: Acute   Code(s): Z3A.39 - 39 WEEKS GESTATION OF 

PREGNANCY   SNOMED Code(s): 20938030


   





(2) Group B streptococcal carriage complicating pregnancy


Current Visit: No   Status: Acute   Code(s): O99.820 - STREPTOCOCCUS B CARRIER 

STATE COMPLICATING PREGNANCY   SNOMED Code(s): 064676227805541


   





(3) Elective induction of labor planned


Current Visit: No   Status: Acute   Code(s): ROS5062 -    SNOMED Code(s): 

341483523

## 2020-07-08 RX ADMIN — IBUPROFEN PRN MG: 600 TABLET ORAL at 02:05

## 2020-07-08 RX ADMIN — HYDROCODONE BITARTRATE AND ACETAMINOPHEN PRN EACH: 5; 325 TABLET ORAL at 05:48

## 2020-07-08 RX ADMIN — DOCUSATE SODIUM AND SENNOSIDES SCH EACH: 50; 8.6 TABLET ORAL at 09:25

## 2020-07-08 RX ADMIN — IBUPROFEN PRN MG: 600 TABLET ORAL at 09:26

## 2020-07-08 NOTE — P.DS
Providers


Date of admission: 


20 05:51





Expected date of discharge: 20


Attending physician: 


Mingo Marsh





Primary care physician: 


Guadalupe Lyles








- Discharge Diagnosis(es)


(1) 39 weeks gestation of pregnancy


Current Visit: No   Status: Acute   





(2) Group B streptococcal carriage complicating pregnancy


Current Visit: No   Status: Acute   





(3) Elective induction of labor planned


Current Visit: No   Status: Acute   


Hospital Course: 





Please see dictated H&P for intimate details of this patient's admission.  Brief

summary this pleasant 25-year-old  1 para 0 female 39-3/7 weeks gestation

who is admitted to labor and delivery for requested induction of labor.  Patient

goes on to have a primary  section for nonreassuring fetal heart 

tones(nuchal cord).  Please see dictated operative note.  Postoperatively the 

patient does well postoperative and 2 cell to be stable for discharge home 

follow up with me in 1 week.


Procedures: 





Induction of labor and primary low transverse  section


Patient Condition at Discharge: Good





Plan - Discharge Summary


New Discharge Prescriptions: 


New


   Ibuprofen [Motrin] 600 mg PO Q6HR PRN #40 tab


     PRN Reason: Mild Pain Or Fever >= 100.5


   HYDROcodone/APAP 5-325MG [Norco 5-325] 1 each PO Q4HR PRN #18 tab


     PRN Reason: Moderate Pain





No Action


   Pnv,Calcium 72/Iron/Folic Acid [Prenatal Plus Tablet] 1 tab PO DAILY


Discharge Medication List





Pnv,Calcium 72/Iron/Folic Acid [Prenatal Plus Tablet] 1 tab PO DAILY 20 

[History]


HYDROcodone/APAP 5-325MG [[place] 5-325] 1 each PO Q4HR PRN #18 tab 20 [Rx]


Ibuprofen [Motrin] 600 mg PO Q6HR PRN #40 tab 20 [Rx]








Follow up Appointment(s)/Referral(s): 


Mingo Marsh MD [STAFF PHYSICIAN] - 20 8:30 am (Please see me on  at 10:15 AM for a postpartum appointment as well.)


Patient Instructions/Handouts:   (DC)


Activity/Diet/Wound Care/Special Instructions: 


No strenuous activity or heavy lifting for 6 weeks.  No intercourse or anything 

per vagina for 6 weeks.  Please call if any fever, chills, excessive vaginal 

bleeding, and/or abdominal pain.


Discharge Disposition: HOME SELF-CARE

## 2020-07-08 NOTE — P.PNOBGPC
Subjective





- Subjective


Patient reports: Reports appetite normal, Reports voiding normally, Reports pain

well controlled, Reports ambulating normally


[place]: doing well





Objective





- Vital Signs


Latest vital signs: 


                                   Vital Signs











  Temp Pulse Resp BP Pulse Ox


 


 20 00:00  94.8 F L  78  12  132/84  99


 


 20 16:00  98.7 F  107 H  16  120/69 


 


 20 11:48  98.4 F  95  16  126/72 


 


 20 07:48  98.3 F  86  16  124/64  96








                                Intake and Output











 20





 14:59 22:59 06:59


 


Output Total 600  


 


Balance -600  


 


Output:   


 


  Urine 600  


 


Other:   


 


  # Voids 1  














- Exam


Lungs: bilateral: normal


Chest: Normal S1, Normal S2


Extremities: Present: normal


Abdomen: Present: normal appearance, soft.  Absent: distention, tenderness


Incision: Present: normal, dry, intact


Uterus: Present: normal, firm





Assessment and Plan


Assessment: 





Post operative day #2.  Patient is resting without complaints and wishes to go 

home.  Vital signs are stable she is afebrile.  Uterus is firm nontender she's 

having normal lochia.  Incision is intact and dry.  Patient's tolerating regular

diet, ambulating, urinating without difficulty.  My impression is a normal 

postpartum course.  Plan is to continue routine postoperative care and discharge

home later today.


(1) 39 weeks gestation of pregnancy


Current Visit: No   Status: Acute   Code(s): Z3A.39 - 39 WEEKS GESTATION OF 

PREGNANCY   SNOMED Code(s): 64017706


   





(2) Group B streptococcal carriage complicating pregnancy


Current Visit: No   Status: Acute   Code(s): O99.820 - STREPTOCOCCUS B CARRIER 

STATE COMPLICATING PREGNANCY   SNOMED Code(s): 710299091745407


   





(3) Elective induction of labor planned


Current Visit: No   Status: Acute   Code(s): YLO2401 -    SNOMED Code(s): 

[id number]

## 2020-07-10 VITALS
HEART RATE: 85 BPM | DIASTOLIC BLOOD PRESSURE: 75 MMHG | TEMPERATURE: 98.2 F | SYSTOLIC BLOOD PRESSURE: 118 MMHG | RESPIRATION RATE: 18 BRPM